# Patient Record
Sex: FEMALE | Race: WHITE | NOT HISPANIC OR LATINO | URBAN - METROPOLITAN AREA
[De-identification: names, ages, dates, MRNs, and addresses within clinical notes are randomized per-mention and may not be internally consistent; named-entity substitution may affect disease eponyms.]

---

## 2023-04-08 ENCOUNTER — EMERGENCY (EMERGENCY)
Facility: HOSPITAL | Age: 88
LOS: 0 days | Discharge: HOME | End: 2023-04-08
Attending: EMERGENCY MEDICINE
Payer: MEDICARE

## 2023-04-08 VITALS
TEMPERATURE: 98 F | DIASTOLIC BLOOD PRESSURE: 58 MMHG | RESPIRATION RATE: 20 BRPM | HEART RATE: 127 BPM | SYSTOLIC BLOOD PRESSURE: 130 MMHG | OXYGEN SATURATION: 99 %

## 2023-04-08 VITALS
HEART RATE: 88 BPM | SYSTOLIC BLOOD PRESSURE: 105 MMHG | RESPIRATION RATE: 18 BRPM | DIASTOLIC BLOOD PRESSURE: 63 MMHG | OXYGEN SATURATION: 96 %

## 2023-04-08 LAB
ALBUMIN SERPL ELPH-MCNC: 4.2 G/DL — SIGNIFICANT CHANGE UP (ref 3.5–5.2)
ALP SERPL-CCNC: 34 U/L — SIGNIFICANT CHANGE UP (ref 30–115)
ALT FLD-CCNC: 25 U/L — SIGNIFICANT CHANGE UP (ref 0–41)
ANION GAP SERPL CALC-SCNC: 17 MMOL/L — HIGH (ref 7–14)
APTT BLD: 35.2 SEC — SIGNIFICANT CHANGE UP (ref 27–39.2)
AST SERPL-CCNC: 58 U/L — HIGH (ref 0–41)
BASE EXCESS BLDV CALC-SCNC: 5.4 MMOL/L — HIGH (ref -2–3)
BASOPHILS # BLD AUTO: 0.02 K/UL — SIGNIFICANT CHANGE UP (ref 0–0.2)
BASOPHILS NFR BLD AUTO: 0.3 % — SIGNIFICANT CHANGE UP (ref 0–1)
BILIRUB SERPL-MCNC: 0.5 MG/DL — SIGNIFICANT CHANGE UP (ref 0.2–1.2)
BUN SERPL-MCNC: 31 MG/DL — HIGH (ref 10–20)
CA-I SERPL-SCNC: 1.13 MMOL/L — LOW (ref 1.15–1.33)
CALCIUM SERPL-MCNC: 10 MG/DL — SIGNIFICANT CHANGE UP (ref 8.4–10.5)
CHLORIDE SERPL-SCNC: 91 MMOL/L — LOW (ref 98–110)
CO2 SERPL-SCNC: 27 MMOL/L — SIGNIFICANT CHANGE UP (ref 17–32)
CREAT SERPL-MCNC: 1.1 MG/DL — SIGNIFICANT CHANGE UP (ref 0.7–1.5)
EGFR: 49 ML/MIN/1.73M2 — LOW
EOSINOPHIL # BLD AUTO: 0.09 K/UL — SIGNIFICANT CHANGE UP (ref 0–0.7)
EOSINOPHIL NFR BLD AUTO: 1.5 % — SIGNIFICANT CHANGE UP (ref 0–8)
GAS PNL BLDV: 123 MMOL/L — LOW (ref 136–145)
GAS PNL BLDV: SIGNIFICANT CHANGE UP
GAS PNL BLDV: SIGNIFICANT CHANGE UP
GLUCOSE SERPL-MCNC: 127 MG/DL — HIGH (ref 70–99)
HCO3 BLDV-SCNC: 32 MMOL/L — HIGH (ref 22–29)
HCT VFR BLD CALC: 43.2 % — SIGNIFICANT CHANGE UP (ref 37–47)
HCT VFR BLDA CALC: 64 % — CRITICAL HIGH (ref 39–51)
HGB BLD CALC-MCNC: 21.4 G/DL — CRITICAL HIGH (ref 12.6–17.4)
HGB BLD-MCNC: 14 G/DL — SIGNIFICANT CHANGE UP (ref 12–16)
IMM GRANULOCYTES NFR BLD AUTO: 0.3 % — SIGNIFICANT CHANGE UP (ref 0.1–0.3)
INR BLD: 3.09 RATIO — HIGH (ref 0.65–1.3)
LACTATE BLDV-MCNC: 9.5 MMOL/L — CRITICAL HIGH (ref 0.5–2)
LACTATE SERPL-SCNC: 4 MMOL/L — CRITICAL HIGH (ref 0.7–2)
LYMPHOCYTES # BLD AUTO: 1.42 K/UL — SIGNIFICANT CHANGE UP (ref 1.2–3.4)
LYMPHOCYTES # BLD AUTO: 23.8 % — SIGNIFICANT CHANGE UP (ref 20.5–51.1)
MAGNESIUM SERPL-MCNC: 2.1 MG/DL — SIGNIFICANT CHANGE UP (ref 1.8–2.4)
MCHC RBC-ENTMCNC: 29.5 PG — SIGNIFICANT CHANGE UP (ref 27–31)
MCHC RBC-ENTMCNC: 32.4 G/DL — SIGNIFICANT CHANGE UP (ref 32–37)
MCV RBC AUTO: 91.1 FL — SIGNIFICANT CHANGE UP (ref 81–99)
MONOCYTES # BLD AUTO: 0.58 K/UL — SIGNIFICANT CHANGE UP (ref 0.1–0.6)
MONOCYTES NFR BLD AUTO: 9.7 % — HIGH (ref 1.7–9.3)
NEUTROPHILS # BLD AUTO: 3.84 K/UL — SIGNIFICANT CHANGE UP (ref 1.4–6.5)
NEUTROPHILS NFR BLD AUTO: 64.4 % — SIGNIFICANT CHANGE UP (ref 42.2–75.2)
NRBC # BLD: 0 /100 WBCS — SIGNIFICANT CHANGE UP (ref 0–0)
PCO2 BLDV: 49 MMHG — HIGH (ref 39–42)
PH BLDV: 7.42 — SIGNIFICANT CHANGE UP (ref 7.32–7.43)
PLATELET # BLD AUTO: 159 K/UL — SIGNIFICANT CHANGE UP (ref 130–400)
PO2 BLDV: 50 MMHG — SIGNIFICANT CHANGE UP
POTASSIUM BLDV-SCNC: 4.3 MMOL/L — SIGNIFICANT CHANGE UP (ref 3.5–5.1)
POTASSIUM SERPL-MCNC: SIGNIFICANT CHANGE UP MMOL/L (ref 3.5–5)
POTASSIUM SERPL-SCNC: SIGNIFICANT CHANGE UP MMOL/L (ref 3.5–5)
PROT SERPL-MCNC: 7.1 G/DL — SIGNIFICANT CHANGE UP (ref 6–8)
PROTHROM AB SERPL-ACNC: 36.5 SEC — HIGH (ref 9.95–12.87)
RBC # BLD: 4.74 M/UL — SIGNIFICANT CHANGE UP (ref 4.2–5.4)
RBC # FLD: 15.6 % — HIGH (ref 11.5–14.5)
SAO2 % BLDV: 80.9 % — SIGNIFICANT CHANGE UP
SODIUM SERPL-SCNC: 135 MMOL/L — SIGNIFICANT CHANGE UP (ref 135–146)
TROPONIN T SERPL-MCNC: <0.01 NG/ML — SIGNIFICANT CHANGE UP
WBC # BLD: 5.97 K/UL — SIGNIFICANT CHANGE UP (ref 4.8–10.8)
WBC # FLD AUTO: 5.97 K/UL — SIGNIFICANT CHANGE UP (ref 4.8–10.8)

## 2023-04-08 PROCEDURE — 85018 HEMOGLOBIN: CPT

## 2023-04-08 PROCEDURE — 82330 ASSAY OF CALCIUM: CPT

## 2023-04-08 PROCEDURE — 80053 COMPREHEN METABOLIC PANEL: CPT

## 2023-04-08 PROCEDURE — 93005 ELECTROCARDIOGRAM TRACING: CPT

## 2023-04-08 PROCEDURE — 82962 GLUCOSE BLOOD TEST: CPT

## 2023-04-08 PROCEDURE — 84484 ASSAY OF TROPONIN QUANT: CPT

## 2023-04-08 PROCEDURE — 93010 ELECTROCARDIOGRAM REPORT: CPT

## 2023-04-08 PROCEDURE — 96375 TX/PRO/DX INJ NEW DRUG ADDON: CPT

## 2023-04-08 PROCEDURE — 99285 EMERGENCY DEPT VISIT HI MDM: CPT | Mod: 25

## 2023-04-08 PROCEDURE — 83735 ASSAY OF MAGNESIUM: CPT

## 2023-04-08 PROCEDURE — 85025 COMPLETE CBC W/AUTO DIFF WBC: CPT

## 2023-04-08 PROCEDURE — 96374 THER/PROPH/DIAG INJ IV PUSH: CPT

## 2023-04-08 PROCEDURE — 85014 HEMATOCRIT: CPT

## 2023-04-08 PROCEDURE — 71045 X-RAY EXAM CHEST 1 VIEW: CPT | Mod: 26

## 2023-04-08 PROCEDURE — 71045 X-RAY EXAM CHEST 1 VIEW: CPT

## 2023-04-08 PROCEDURE — 85610 PROTHROMBIN TIME: CPT

## 2023-04-08 PROCEDURE — 36415 COLL VENOUS BLD VENIPUNCTURE: CPT

## 2023-04-08 PROCEDURE — 84132 ASSAY OF SERUM POTASSIUM: CPT

## 2023-04-08 PROCEDURE — 85730 THROMBOPLASTIN TIME PARTIAL: CPT

## 2023-04-08 PROCEDURE — 82803 BLOOD GASES ANY COMBINATION: CPT

## 2023-04-08 PROCEDURE — 83605 ASSAY OF LACTIC ACID: CPT

## 2023-04-08 PROCEDURE — 99284 EMERGENCY DEPT VISIT MOD MDM: CPT | Mod: GC

## 2023-04-08 PROCEDURE — 84295 ASSAY OF SERUM SODIUM: CPT

## 2023-04-08 RX ORDER — METOPROLOL TARTRATE 50 MG
5 TABLET ORAL ONCE
Refills: 0 | Status: COMPLETED | OUTPATIENT
Start: 2023-04-08 | End: 2023-04-08

## 2023-04-08 RX ORDER — SODIUM CHLORIDE 9 MG/ML
1000 INJECTION, SOLUTION INTRAVENOUS ONCE
Refills: 0 | Status: DISCONTINUED | OUTPATIENT
Start: 2023-04-08 | End: 2023-04-08

## 2023-04-08 RX ADMIN — Medication 110 MILLIGRAM(S): at 18:59

## 2023-04-08 RX ADMIN — Medication 5 MILLIGRAM(S): at 15:42

## 2023-04-08 NOTE — ED PROVIDER NOTE - CONSIDERATION OF ADMISSION OBSERVATION
Consideration of Admission/Observation Patient is a good candidate to attempt outpatient management. Supportive care and home care discussed in detail. Patient aware they may have to return for re-evaluation and possible admission if outpatient treatment fails. Strict return precautions discussed.

## 2023-04-08 NOTE — ED PROVIDER NOTE - ATTENDING CONTRIBUTION TO CARE
I personally evaluated patient. I agree with the findings and plan with all documentation on chart except as documented  in my note. History taken with resident.    87-year-old F PMHx diabetes hyperlipidemia A-fib on Coumadin and mechanical mitral valve repair presents to the emergency department brought in by EMS from home with palpitations and shortness of breath since last night.  Patient is currently on metoprolol 100 mg twice daily and she is compliant with her meds.  Patient is supposed to be on torsemide but did not take her morning dose.  Denies fevers chills nausea vomiting diarrhea abdominal pain or lower extremity edema. Pt is Scottish speaking and  was ED staff and daughter as requested by patient. No fever, chills, calf pain, or leg swelling.    Patient brought to Critical Care ED. VS reviewed and patient placed on monitor. Patient in A Fib with RVR. No signs of fluid overload and patient is well appearing. No calf tenderness. Patient has a benign abdominal exam. IV placed and labs sent. Patient given IV medications and rate improved to normal. Patient felt immediate improvement and back to baseline. Troponin is normal. INR appropriate and VBG shows normal acid base status. Patient initially had an elevated lactate (VBG suspected to be ran after a long time and doesn't correlate with how well patient looks). Lactate repeated after time and is 4. This is still high and results discussed with patient. Admission offered and shared decision making utilized. Patient would prefer outpatient care and has immediate follow up with her doctor. Daughter spoken to in detail about all of this and everyone is in agreement. Patient is a good candidate to attempt outpatient management. Supportive care and home care discussed in detail. Patient aware they may have to return for re-evaluation and possible admission if outpatient treatment fails. Strict return precautions discussed.      Full DC instructions discussed and patient knows when to seek immediate medical attention.  Patient has proper follow up.  All results discussed and patient aware they may require further work up.  Proper follow up ensured. Limitations of ED work up discussed.  Medications administered and prescribed/OTC home meds discussed.  All questions and concerns from patient or family addressed. Understanding of instructions verbalized.

## 2023-04-08 NOTE — ED ADULT TRIAGE NOTE - CHIEF COMPLAINT QUOTE
BIBA from home c'o palpitations since last night. on coumadin and diltiazem for A-fib pt. o2 dependent 2 L

## 2023-04-08 NOTE — ED PROVIDER NOTE - NSFOLLOWUPINSTRUCTIONS_ED_ALL_ED_FT
You were evaluated in the Emergency Department today, and your visit did not reveal anything immediately life-threatening.    However, it is important that you follow-up with your PRIMARY CARE PHYSICIAN within 3-5 days.    ---------------------------------------------------------------------------------------------------    Atrial Fibrillation    Atrial fibrillation is a type of irregular or rapid heartbeat (arrhythmia). In atrial fibrillation, the top part of the heart (atria) beats in an irregular pattern. This makes the heart unable to pump blood normally and effectively.    The goal of treatment is to prevent blood clots from forming, control your heart rate, or restore your heartbeat to a normal rhythm. If this condition is not treated, it can cause serious problems, such as a weakened heart muscle (cardiomyopathy) or a stroke.    What are the causes?  This condition is often caused by medical conditions that damage the heart's electrical system. These include:  High blood pressure (hypertension). This is the most common cause.  Certain heart problems or conditions, such as heart failure, coronary artery disease, heart valve problems, or heart surgery.  Diabetes.  Overactive thyroid (hyperthyroidism).  Obesity.  Chronic kidney disease.  In some cases, the cause of this condition is not known.    What increases the risk?  This condition is more likely to develop in:  Older people.  People who smoke.  Athletes who do endurance exercise.  People who have a family history of atrial fibrillation.  Men.  People who use drugs.  People who drink a lot of alcohol.  People who have lung conditions, such as emphysema, pneumonia, or COPD.  People who have obstructive sleep apnea.    What are the signs or symptoms?  Symptoms of this condition include:  A feeling that your heart is racing or beating irregularly.  Discomfort or pain in your chest.  Shortness of breath.  Sudden light-headedness or weakness.  Tiring easily during exercise or activity.  Fatigue.  Syncope (fainting).  Sweating.  In some cases, there are no symptoms.    How is this treated?  Treatment depends on underlying conditions and how you feel when you experience atrial fibrillation. This condition may be treated with:  Medicines to prevent blood clots or to treat heart rate or heart rhythm problems.  Electrical cardioversion to reset the heart's rhythm.  A pacemaker to correct abnormal heart rhythm.  Ablation to remove the heart tissue that sends abnormal signals.  Left atrial appendage closure to seal the area where blood clots can form.  In some cases, underlying conditions will be treated.    Follow these instructions at home:  Medicines  Take over-the counter and prescription medicines only as told by your health care provider.  Do not take any new medicines without talking to your health care provider.  If you are taking blood thinners:  Talk with your health care provider before you take any medicines that contain aspirin or NSAIDs, such as ibuprofen. These medicines increase your risk for dangerous bleeding.  Take your medicine exactly as told, at the same time every day.  Avoid activities that could cause injury or bruising, and follow instructions about how to prevent falls.  Wear a medical alert bracelet or carry a card that lists what medicines you take.    Lifestyle  Do not use any products that contain nicotine or tobacco, such as cigarettes, e-cigarettes, and chewing tobacco. If you need help quitting, ask your health care provider.  Eat heart-healthy foods. Talk with a dietitian to make an eating plan that is right for you.  Exercise regularly as told by your health care provider.  Do not drink alcohol.  Lose weight if you are overweight.  Do not use drugs, including cannabis.    General instructions  If you have obstructive sleep apnea, manage your condition as told by your health care provider.  Do not use diet pills unless your health care provider approves. Diet pills can make heart problems worse.  Keep all follow-up visits as told by your health care provider. This is important.    Contact a health care provider if you:  Notice a change in the rate, rhythm, or strength of your heartbeat.  Are taking a blood thinner and you notice more bruising.  Tire more easily when you exercise or do heavy work.  Have a sudden change in weight.    Get help right away if you have:  Chest pain, abdominal pain, sweating, or weakness.  Trouble breathing.  Side effects of blood thinners, such as blood in your vomit, stool, or urine, or bleeding that cannot stop.  Any symptoms of a stroke. "BE FAST" is an easy way to remember the main warning signs of a stroke:  B - Balance. Signs are dizziness, sudden trouble walking, or loss of balance.  E - Eyes. Signs are trouble seeing or a sudden change in vision.  F - Face. Signs are sudden weakness or numbness of the face, or the face or eyelid drooping on one side.  A - Arms. Signs are weakness or numbness in an arm. This happens suddenly and usually on one side of the body.  S - Speech. Signs are sudden trouble speaking, slurred speech, or trouble understanding what people say.  T - Time. Time to call emergency services. Write down what time symptoms started.  Other signs of a stroke, such as:  A sudden, severe headache with no known cause.  Nausea or vomiting.  Seizure.  These symptoms may represent a serious problem that is an emergency. Do not wait to see if the symptoms will go away. Get medical help right away. Call your local emergency services (911 in the [place] Do not drive yourself to the hospital.    Summary    Atrial fibrillation is a type of irregular or rapid heartbeat (arrhythmia).  Symptoms include a feeling that your heart is beating fast or irregularly.  You may be given medicines to prevent blood clots or to treat heart rate or heart rhythm problems.  Get help right away if you have signs or symptoms of a stroke.  Get help right away if you cannot catch your breath or have chest pain or pressure. You were evaluated in the Emergency Department today, and your visit did not reveal anything immediately life-threatening.    However, it is important that you follow-up with your PRIMARY CARE PHYSICIAN and CARDIOLOGIST within 1-5 days.    ---------------------------------------------------------------------------------------------------    Atrial Fibrillation    Atrial fibrillation is a type of irregular or rapid heartbeat (arrhythmia). In atrial fibrillation, the top part of the heart (atria) beats in an irregular pattern. This makes the heart unable to pump blood normally and effectively.    The goal of treatment is to prevent blood clots from forming, control your heart rate, or restore your heartbeat to a normal rhythm. If this condition is not treated, it can cause serious problems, such as a weakened heart muscle (cardiomyopathy) or a stroke.    What are the causes?  This condition is often caused by medical conditions that damage the heart's electrical system. These include:  High blood pressure (hypertension). This is the most common cause.  Certain heart problems or conditions, such as heart failure, coronary artery disease, heart valve problems, or heart surgery.  Diabetes.  Overactive thyroid (hyperthyroidism).  Obesity.  Chronic kidney disease.  In some cases, the cause of this condition is not known.    What increases the risk?  This condition is more likely to develop in:  Older people.  People who smoke.  Athletes who do endurance exercise.  People who have a family history of atrial fibrillation.  Men.  People who use drugs.  People who drink a lot of alcohol.  People who have lung conditions, such as emphysema, pneumonia, or COPD.  People who have obstructive sleep apnea.    What are the signs or symptoms?  Symptoms of this condition include:  A feeling that your heart is racing or beating irregularly.  Discomfort or pain in your chest.  Shortness of breath.  Sudden light-headedness or weakness.  Tiring easily during exercise or activity.  Fatigue.  Syncope (fainting).  Sweating.  In some cases, there are no symptoms.    How is this treated?  Treatment depends on underlying conditions and how you feel when you experience atrial fibrillation. This condition may be treated with:  Medicines to prevent blood clots or to treat heart rate or heart rhythm problems.  Electrical cardioversion to reset the heart's rhythm.  A pacemaker to correct abnormal heart rhythm.  Ablation to remove the heart tissue that sends abnormal signals.  Left atrial appendage closure to seal the area where blood clots can form.  In some cases, underlying conditions will be treated.    Follow these instructions at home:  Medicines  Take over-the counter and prescription medicines only as told by your health care provider.  Do not take any new medicines without talking to your health care provider.  If you are taking blood thinners:  Talk with your health care provider before you take any medicines that contain aspirin or NSAIDs, such as ibuprofen. These medicines increase your risk for dangerous bleeding.  Take your medicine exactly as told, at the same time every day.  Avoid activities that could cause injury or bruising, and follow instructions about how to prevent falls.  Wear a medical alert bracelet or carry a card that lists what medicines you take.    Lifestyle  Do not use any products that contain nicotine or tobacco, such as cigarettes, e-cigarettes, and chewing tobacco. If you need help quitting, ask your health care provider.  Eat heart-healthy foods. Talk with a dietitian to make an eating plan that is right for you.  Exercise regularly as told by your health care provider.  Do not drink alcohol.  Lose weight if you are overweight.  Do not use drugs, including cannabis.    General instructions  If you have obstructive sleep apnea, manage your condition as told by your health care provider.  Do not use diet pills unless your health care provider approves. Diet pills can make heart problems worse.  Keep all follow-up visits as told by your health care provider. This is important.    Contact a health care provider if you:  Notice a change in the rate, rhythm, or strength of your heartbeat.  Are taking a blood thinner and you notice more bruising.  Tire more easily when you exercise or do heavy work.  Have a sudden change in weight.    Get help right away if you have:  Chest pain, abdominal pain, sweating, or weakness.  Trouble breathing.  Side effects of blood thinners, such as blood in your vomit, stool, or urine, or bleeding that cannot stop.  Any symptoms of a stroke. "BE FAST" is an easy way to remember the main warning signs of a stroke:  B - Balance. Signs are dizziness, sudden trouble walking, or loss of balance.  E - Eyes. Signs are trouble seeing or a sudden change in vision.  F - Face. Signs are sudden weakness or numbness of the face, or the face or eyelid drooping on one side.  A - Arms. Signs are weakness or numbness in an arm. This happens suddenly and usually on one side of the body.  S - Speech. Signs are sudden trouble speaking, slurred speech, or trouble understanding what people say.  T - Time. Time to call emergency services. Write down what time symptoms started.  Other signs of a stroke, such as:  A sudden, severe headache with no known cause.  Nausea or vomiting.  Seizure.  These symptoms may represent a serious problem that is an emergency. Do not wait to see if the symptoms will go away. Get medical help right away. Call your local emergency services (911 in the [place] Do not drive yourself to the hospital.    Summary    Atrial fibrillation is a type of irregular or rapid heartbeat (arrhythmia).  Symptoms include a feeling that your heart is beating fast or irregularly.  You may be given medicines to prevent blood clots or to treat heart rate or heart rhythm problems.  Get help right away if you have signs or symptoms of a stroke.  Get help right away if you cannot catch your breath or have chest pain or pressure.      ---------------------------------------------------------------------------------------------------    The lactic acid test helps determine how much oxygen your body tissues are getting. Sugar (glucose) is used by your body for energy. When the oxygen supply to tissues is normal, glucose is broken down (metabolized) into carbon dioxide and water. However, when the oxygen supply is lower than normal, lactic acid (lactate) is created instead. Anything that decreases your ability to get oxygen to your cells can increase lactate levels.

## 2023-04-08 NOTE — ED ADULT NURSE NOTE - AS PAIN REST
01/08/21 Patient notified of negative Covid 19 result
0 (no pain/absence of nonverbal indicators of pain)

## 2023-04-08 NOTE — ED PROVIDER NOTE - PROGRESS NOTE DETAILS
Resident AO: Patient received as sign-out from Dr. Quijano, pending reassessment/dispo. On assessment, she is AAOx3 (able to assess directly, as fluent in [place]), SOB resolved, no chest pain. HR in 80s (received metoprolol 5 mg IVP x2). INR within therapeutic range. Repeat lactate resent. If clearing, will discharge - patient and daughter at bedside are comfortable and aware of all results. Resident AO: Lactate downtrended, however still elevated at 4; pH normal. Had an extensive discussion with patient and daughter at bedside about significance/meaning of lactate, and made a share decision for discharge with close outpatient follow-up. Patient has a scheduled appointment on Tues 4/11 with her PMD (Dr. Padilla), and plan to call Cardiologist Dr. Myers on Mon 4/10. Strict return precautions given.

## 2023-04-08 NOTE — ED PROVIDER NOTE - PATIENT PORTAL LINK FT
You can access the FollowMyHealth Patient Portal offered by [facility] by registering at the following website: [email or web address]. By joining [facility] [employer]’s FollowMyHealth portal, you will also be able to view your health information using other applications (apps) compatible with our system.

## 2023-04-08 NOTE — ED ADULT NURSE REASSESSMENT NOTE - NS ED NURSE REASSESS COMMENT FT1
Report taken from JOSÉ MIGUEL Matias.  Patient assessed, daughter bedside.  A/O x 4, Israeli speaking daughter translating, refusing  phone.  No S/S of acute pain or distress at this time.  Pt safety and comfort maintained.  Call bell within reach, mattress alarm in place.

## 2023-04-08 NOTE — ED PROVIDER NOTE - OBJECTIVE STATEMENT
87-year-old female with past medical history of diabetes hyperlipidemia A-fib on Coumadin and mechanical mitral valve repair presents to the emergency department brought in by EMS from home with palpitations and shortness of breath since last night.  Patient is currently on toprolol 100 mg twice daily and she is compliant with her meds.  Patient is supposed to be on torsemide but did not take her morning dose.  Denies fevers chills nausea vomiting diarrhea abdominal pain or lower extremity edema. Pt is Romanian speaking and I was able to communicate w pt in Romanian. Daughter at bedside.

## 2023-04-24 NOTE — ED PROVIDER NOTE - MDM ORDERS SUBMITTED SELECTION
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   • Chronic kidney disease     borderline lab values   • Chronic rhinitis 06/04/2012   • Clotting disorder ([facility] [address] ) 2012   • Coronary artery disease 2018   • Encephalitis     Lasted Assessed 10/18/2017   • Gout 06/26/2018   • Hypertension    • L[facility]    • Maxillary sinusitis     Lasted Assessed 10/18/2017   • Night sweat    • Osteopenia     Hip   • Osteoporosis     Spine   • Pneumonia     Last Assessed 10/18/2017   • PONV (postoperative nausea and vomiting)    • Pulmonary embolism (HCC)    • Pulmonary hypertension (HCC)    • Seizures (HCC)     Only during Encephalitis   • Shortness of breath     with exertion   • Sinus tachycardia    • Urinary incontinence        Family History   Problem Relation Age of Onset   • Uterine cancer Mother    • Pancreatic cancer Mother 79   • Diabetes Mother    • Endometrial cancer Mother 77   • Arthritis Mother    • Cancer Mother         Pancreas, Uterine   • Vision loss Mother    • Heart disease Father         open heart surgery at age 48    • Stroke Father         CVA   • Hypertension Father    • Atrial fibrillation Father    • Hyperlipidemia Father    • Arthritis Father    • Rheum arthritis Father    • No Known Problems Sister    • No Known Problems Sister    • Thyroid disease Sister    • Depression Sister    • Osteoarthritis Sister    • Asthma Daughter    • Migraines Daughter    • Asthma Daughter    • Thyroid disease Maternal Grandmother    • Heart attack "Maternal Grandfather    • Heart attack Paternal Grandmother    • Skin cancer Paternal Grandfather    • No Known Problems Brother    • Hemochromatosis Brother    • No Known Problems Maternal Aunt    • No Known Problems Paternal Aunt    • Anuerysm Neg Hx    • Clotting disorder Neg Hx    • Heart failure Neg Hx        Past Surgical History:   Procedure Laterality Date   • ARTHRODESIS      Hand: IP Joint   • CHOLECYSTECTOMY     • COLONOSCOPY     • COLPOSCOPY     • HYSTERECTOMY      Vaginal   • JOINT REPLACEMENT Right     Knee replacement   • KNEE SURGERY  2/2019   • LAPAROSCOPIC ESOPHAGOGASTRIC FUNDOPLASTY  2008   • VA ARTHRP KNE CONDYLE&PLATU MEDIAL&LAT COMPARTMENTS Right 2/12/2019    Procedure: KNEE TOTAL ARTHROPLASTY AND ASSOCIATED PROCEDURES;  Surgeon: Jeremias Toney DO;  [facility];  Service: Orthopedics   • VA ARTHRP KNE CONDYLE&PLATU MEDIAL&LAT COMPARTMENTS Left 10/6/2022    Procedure: ARTHROPLASTY KNEE TOTAL;  Surgeon: Jeremias Toney DO;  [facility];  Service: Orthopedics   • VA ARTHRS KNEE W/MENISCECTOMY MED&LAT W/SHAVING Right 10/2/2018    Procedure: KNEE ARTHROSCOPY WITH PARTIAL MEDIAL MENISCECTOMY;  Surgeon: Jeremias Toney DO;  [facility];  Service: Orthopedics   • VA ARTHRS KNEE W/MENISCECTOMY MED&LAT W/SHAVING Left 12/17/2019    Procedure: KNEE ARTHROSCOPIC MENISCECTOMY /MEDIAL; Chondyl medial and posterior;  Surgeon: Jeremias Toney DO;  [facility];  Service: Orthopedics   • REDUCTION MAMMAPLASTY     • REPAIR ANKLE LIGAMENT Right    • TONSILLECTOMY          reports that she quit smoking about 17 years ago  Her smoking use included cigarettes  She has never used smokeless tobacco  She reports current alcohol use  She reports that she does not use drugs        Current Outpatient Medications:   •  B-D 3CC LUER-LILLIE SYR 25GX1/2\" 25G X 1-1/2\" 3 ML MISC, USE 1 SYRINGE EVERY 30 DAYS, Disp: 12 each, Rfl: 1  •  Benlysta 200 MG/ML SOAJ, Weekly on Fridays, Disp: , Rfl:   •  " "cholecalciferol (VITAMIN D3) 1,000 units tablet, Take 2,000 Units by mouth daily in the early morning  , Disp: , Rfl:   •  cyanocobalamin 1,000 mcg/mL, Inject 1 mL (1,000 mcg total) into a muscle every 30 (thirty) days, Disp: 12 mL, Rfl: 1  •  enoxaparin (LOVENOX) 100 mg/mL, Inject 100 mg under the skin every 12 hours  Start 3 days prior to colonoscopy, and stop 24 hours prior to colonoscopy  Resume injections 12 hours after colonoscopy or as directed by GI doctor , Disp: 20 mL, Rfl: 0  •  gabapentin (Neurontin) 100 mg capsule, Take 1 capsule (100 mg total) by mouth daily at bedtime, Disp: 90 capsule, Rfl: 1  •  hydroxychloroquine (PLAQUENIL) 200 mg tablet, Take 400 mg by mouth daily with breakfast Alternating days/dosages, Disp: , Rfl:   •  sildenafil (REVATIO) 20 mg tablet, Take 1 tablet (20 mg total) by mouth 3 (three) times a day, Disp: 270 tablet, Rfl: 3  •  spironolactone (ALDACTONE) 25 mg tablet, Take 1 tablet (25 mg total) by mouth daily, Disp: 90 tablet, Rfl: 3  •  Syringe/Needle, Disp, (SecureSafe Syringe/Needle) 25G X 1-1/2\" 1 ML MISC, Use 1 Syringe every 30 (thirty) days, Disp: 12 each, Rfl: 1  •  traZODone (DESYREL) 50 mg tablet, Take 1 tablet (50 mg total) by mouth daily at bedtime, Disp: 30 tablet, Rfl: 5  •  triamcinolone (KENALOG) 0 1 % oral topical paste, prn, Disp: , Rfl:   •  warfarin (Coumadin) 5 mg tablet, Take 5mg po daily or as directed, Disp: 120 tablet, Rfl: 1  •  warfarin (Coumadin) 5 mg tablet, Take 1 tablet (5 mg total) by mouth daily Take 5 mg Tuesday, Wednesday, Thursday, Saturday and Sunday  Take 7 5 mg on Monday and Friday   Follow up INR , Disp: 104 tablet, Rfl: 0  •  warfarin (Coumadin) 5 mg tablet, 7 5mg po daily or as directed , Disp: 45 tablet, Rfl: 1  •  clindamycin (CLEOCIN) 300 MG capsule, Take 3 capsules by mouth 1 hour prior to dental procedure (Patient not taking: Reported on 3/13/2023), Disp: 3 capsule, Rfl: 2  •  ondansetron (ZOFRAN) 4 mg tablet, Take 1 tablet (4 mg " "total) by mouth every 8 (eight) hours as needed for nausea or vomiting for up to 15 days (Patient not taking: Reported on 12/29/2022), Disp: 20 tablet, Rfl: 0    The following portions of the patient's history were reviewed and updated as appropriate: allergies, current medications, past family history, past medical history, past social history, past surgical history and problem list     Review of Systems   Constitutional: Negative  Respiratory: Negative  Cardiovascular: Negative  Psychiatric/Behavioral: Positive for sleep disturbance  Objective:    /82   Pulse 82   Ht 5' 2\" (1 575 m)   Wt 103 kg (227 lb)   SpO2 98%   BMI 41 52 kg/m²      Physical Exam  Constitutional:       Appearance: Normal appearance  Cardiovascular:      Heart sounds: Normal heart sounds  Pulmonary:      Breath sounds: Normal breath sounds  Neurological:      Mental Status: She is oriented to person, place, and time  Psychiatric:         Mood and Affect: Mood normal            Recent Results (from the past 1008 hour(s))   Protime-INR    Collection Time: 03/20/23  6:17 AM   Result Value Ref Range    Protime 24 8 (H) 11 6 - 14 5 seconds    INR 2 22 (H) 0 84 - 1 19   Protime-INR    Collection Time: 04/03/23  6:12 AM   Result Value Ref Range    Protime 27 1 (H) 11 6 - 14 5 seconds    INR 2 47 (H) 0 84 - 1 19   Lipid panel    Collection Time: 04/03/23  6:12 AM   Result Value Ref Range    Cholesterol 181 See Comment mg/dL    Triglycerides 100 See Comment mg/dL    HDL, Direct 57 >=50 mg/dL    LDL Calculated 104 (H) 0 - 100 mg/dL    Non-HDL-Chol (CHOL-HDL) 124 mg/dl       Assessment/Plan:    No problem-specific Assessment & Plan notes found for this encounter  Problem List Items Addressed This Visit        Other    Sleeping difficulty     Symptoms improved significantly on trazodone 50 mg, gabapentin 100 mg daily  Continue same  Patient tolerating without any side effects    Requesting medication refill " today          Relevant Medications    traZODone (DESYREL) 50 mg tablet    Neuropathic pain, leg, left    Relevant Medications    gabapentin (Neurontin) 100 mg capsule Labs/EKG/Imaging Studies/Medications

## 2023-09-14 NOTE — ED ADULT NURSE NOTE - TEMPLATE LIST FOR HEAD TO TOE ASSESSMENT
[]w/heat  Position:  Location:     []  Traction: [] Cervical       []Lumbar                       [] Prone          []Supine                       []Intermittent   []Continuous Lbs:  [] before manual  [] after manual  []w/heat     []  Ultrasound: []Continuous   [] Pulsed at:                           []1MHz   []3MHz Location:  W/cm2:     [] Paraffin         Location:   []w/heat    NT [x]  Ice     []  Heat  []  Ice massage Position: sitting  Location: left elbow     []  Laser  []  Other: Position:  Location:        []  Vasopneumatic Device Pressure:       [] lo [] med [] hi   Temperature:       [x] Skin assessment post-treatment:  [x]intact []redness- no adverse reaction    []redness - adverse reaction:      [x]  Patient Education billed concurrently with other procedures   [x] Review HEP    [] Progressed/Changed HEP, detail:    [] Other detail:         Other Objective/Functional Measures  None noted    Pain Level at end of session (0-10 scale): at rest/with movement: Elbow: 3/7; Shoulder: 0/1      Assessment   The patient tolerated tricep and bicep work well, but continues to need cues for form in general.  Patient will continue to benefit from skilled PT / OT services to modify and progress therapeutic interventions, analyze and address functional mobility deficits, analyze and address ROM deficits, analyze and address strength deficits, analyze and address soft tissue restrictions, analyze and cue for proper movement patterns, analyze and modify for postural abnormalities, analyze and address imbalance/dizziness, and instruct in home and community integration to address functional deficits and attain remaining goals. Progress toward goals / Updated goals:  []  See Progress Note/Recertification    The patient is progressing towards goals.        PLAN  Yes  Continue plan of care  Re-Cert Due: 74/18/78  [x]  Upgrade activities as tolerated  []  Discharge due to :  []  Other:      Sherree Nyhan, PT       9/14/2023 Cardiac
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
